# Patient Record
Sex: FEMALE | Race: WHITE | NOT HISPANIC OR LATINO
[De-identification: names, ages, dates, MRNs, and addresses within clinical notes are randomized per-mention and may not be internally consistent; named-entity substitution may affect disease eponyms.]

---

## 2017-01-17 DIAGNOSIS — E83.52 HYPERCALCEMIA: ICD-10-CM

## 2017-01-17 DIAGNOSIS — G44.209 TENSION-TYPE HEADACHE, UNSPECIFIED, NOT INTRACTABLE: ICD-10-CM

## 2017-01-17 DIAGNOSIS — R06.02 SHORTNESS OF BREATH: ICD-10-CM

## 2017-01-18 ENCOUNTER — MEDICATION RENEWAL (OUTPATIENT)
Age: 57
End: 2017-01-18

## 2017-02-02 ENCOUNTER — APPOINTMENT (OUTPATIENT)
Dept: CARDIOLOGY | Facility: CLINIC | Age: 57
End: 2017-02-02

## 2017-02-02 ENCOUNTER — NON-APPOINTMENT (OUTPATIENT)
Age: 57
End: 2017-02-02

## 2017-02-02 VITALS
BODY MASS INDEX: 30.91 KG/M2 | DIASTOLIC BLOOD PRESSURE: 104 MMHG | HEART RATE: 102 BPM | OXYGEN SATURATION: 99 % | SYSTOLIC BLOOD PRESSURE: 164 MMHG | WEIGHT: 169 LBS

## 2017-02-02 VITALS — DIASTOLIC BLOOD PRESSURE: 96 MMHG | HEART RATE: 90 BPM | SYSTOLIC BLOOD PRESSURE: 148 MMHG

## 2017-02-02 VITALS
BODY MASS INDEX: 37.86 KG/M2 | SYSTOLIC BLOOD PRESSURE: 145 MMHG | DIASTOLIC BLOOD PRESSURE: 78 MMHG | HEART RATE: 64 BPM | WEIGHT: 207 LBS | OXYGEN SATURATION: 97 %

## 2017-02-02 DIAGNOSIS — M79.1 MYALGIA: ICD-10-CM

## 2017-02-02 DIAGNOSIS — E78.00 PURE HYPERCHOLESTEROLEMIA, UNSPECIFIED: ICD-10-CM

## 2017-02-02 DIAGNOSIS — I10 ESSENTIAL (PRIMARY) HYPERTENSION: ICD-10-CM

## 2017-02-02 DIAGNOSIS — R73.09 OTHER ABNORMAL GLUCOSE: ICD-10-CM

## 2017-02-02 DIAGNOSIS — E03.9 HYPOTHYROIDISM, UNSPECIFIED: ICD-10-CM

## 2017-02-03 ENCOUNTER — NON-APPOINTMENT (OUTPATIENT)
Age: 57
End: 2017-02-03

## 2017-02-03 LAB
CK SERPL-CCNC: 47 U/L
HBA1C MFR BLD HPLC: 6.1 %

## 2017-03-27 ENCOUNTER — RX RENEWAL (OUTPATIENT)
Age: 57
End: 2017-03-27

## 2017-07-05 ENCOUNTER — APPOINTMENT (OUTPATIENT)
Dept: SURGERY | Facility: CLINIC | Age: 57
End: 2017-07-05

## 2017-08-15 RX ORDER — ATORVASTATIN CALCIUM 40 MG/1
40 TABLET, FILM COATED ORAL
Qty: 30 | Refills: 0 | Status: ACTIVE | COMMUNITY
Start: 2017-02-02

## 2017-08-24 ENCOUNTER — APPOINTMENT (OUTPATIENT)
Dept: CARDIOLOGY | Facility: CLINIC | Age: 57
End: 2017-08-24

## 2022-01-06 ENCOUNTER — APPOINTMENT (OUTPATIENT)
Dept: OTOLARYNGOLOGY | Facility: CLINIC | Age: 62
End: 2022-01-06
Payer: COMMERCIAL

## 2022-01-06 VITALS
HEIGHT: 60 IN | BODY MASS INDEX: 31.61 KG/M2 | DIASTOLIC BLOOD PRESSURE: 91 MMHG | WEIGHT: 161 LBS | SYSTOLIC BLOOD PRESSURE: 157 MMHG | RESPIRATION RATE: 96 BRPM

## 2022-01-06 PROCEDURE — 99203 OFFICE O/P NEW LOW 30 MIN: CPT | Mod: 25

## 2022-01-06 PROCEDURE — 31575 DIAGNOSTIC LARYNGOSCOPY: CPT

## 2022-01-06 PROCEDURE — 99072 ADDL SUPL MATRL&STAF TM PHE: CPT

## 2022-01-06 NOTE — HISTORY OF PRESENT ILLNESS
[de-identified] : Denia Pennington is a 62 yo female with hx hypothyroidism and hypertension who presents for evaluation of sore throat. She states that she has had an intermittent sore throat that is worse at night for the past month. She denies dysphagia or odynophagia. She denies dysphonia or dyspnea. She denies aspiration episodes, heartburn, or food regurgitation. She denies fevers, night sweats, unintentional weight loss, or new neck masses/lumps. With the sore throat, she also develops intermittent bilateral aural fullness. She denies otalgia, otorrhea, tinnitus, vertigo, or hearing change. She denies recurrent ear infections. She notes mild nasal congestion and intermittent mild rhinorrhea. She denies postnasal drainage. She notes intermittent very mild sinus pressure.\par \par She notes that she does eat late at night leaving only 1-1.5 hours before lying down. She has several cups of coffee or tea per day as well.

## 2022-01-06 NOTE — PHYSICAL EXAM
[de-identified] : Minimal cerumen right EAC removed. Left EAC clear. Bilateral external ears wnl. [] : septum deviated bilaterally [Midline] : trachea located in midline position [Laryngoscopy Performed] : laryngoscopy was performed, see procedure section for findings [Normal] : no rashes

## 2022-01-06 NOTE — ASSESSMENT
[FreeTextEntry1] : Denia Pennington presents for evaluation of chronic sore throat and intermittent bilateral aural fullness. Her endoscopic exam was notable for postcricoid edema. We discussed that given her currently diet and lifestyle regimen, and the fact that her nasal exam is benign, this is likely secondary to laryngopharyngeal reflux. As her symptoms are relatively mild, we can defer medication at this time. However, antireflux dietary and lifestyle modifications were discussed. A handout was also provided.\par \par - Follow up as needed. She will attempt lifestyle and dietary modifications and if her symptoms persist, she will call our office for follow-up. At that time, we can consider antireflux medication.

## 2022-04-26 ENCOUNTER — APPOINTMENT (OUTPATIENT)
Dept: ORTHOPEDIC SURGERY | Facility: CLINIC | Age: 62
End: 2022-04-26

## 2022-11-08 ENCOUNTER — APPOINTMENT (OUTPATIENT)
Dept: ORTHOPEDIC SURGERY | Facility: CLINIC | Age: 62
End: 2022-11-08

## 2022-11-08 VITALS — WEIGHT: 160 LBS | BODY MASS INDEX: 31.41 KG/M2 | HEIGHT: 60 IN

## 2022-11-08 DIAGNOSIS — M17.12 UNILATERAL PRIMARY OSTEOARTHRITIS, LEFT KNEE: ICD-10-CM

## 2022-11-08 DIAGNOSIS — M17.11 UNILATERAL PRIMARY OSTEOARTHRITIS, RIGHT KNEE: ICD-10-CM

## 2022-11-08 PROCEDURE — 99203 OFFICE O/P NEW LOW 30 MIN: CPT | Mod: 25

## 2022-11-08 PROCEDURE — 20610 DRAIN/INJ JOINT/BURSA W/O US: CPT | Mod: 50

## 2022-11-08 PROCEDURE — 73562 X-RAY EXAM OF KNEE 3: CPT | Mod: LT

## 2022-11-08 RX ORDER — DICLOFENAC SODIUM 100 MG/1
100 TABLET, FILM COATED, EXTENDED RELEASE ORAL
Qty: 30 | Refills: 5 | Status: ACTIVE | COMMUNITY
Start: 2022-11-08 | End: 1900-01-01

## 2022-11-08 NOTE — HISTORY OF PRESENT ILLNESS
[6] : 6 [8] : 8 [Dull/Aching] : dull/aching [Intermittent] : intermittent [Ice] : ice [Lying in bed] : lying in bed [Full time] : Work status: full time [de-identified] : 11/08/22:  Initial visit for this 62 year old female  who was doing a treadmill x 6 months ago and noticed spontaneous onset of pain behind lt knee at the time. Went for MRI lt knee dated 4/11/22 c/w full thickness cartilage loss under patella. Saw another ortho MD who gave her Gel One injection which didn't help. Still c/o pain. Here for another opinion. Can only walk 1 mile. Swims with pain. Difficulty stairclimbing.\par     Also, c/o similar rt knee pain x last 4-5 months. Lt>> rt.  Went for MRI rt knee dated 7/7/22 c/w full thickness cartilage loss under patella. Has tried Advil 600-800 mg prn.\par \par OUTSIDE RECORDS:\par RIGHT KNEE 07/07/22\par IMPRESSION:\par Severe medial patellofemoral osteoarthritis with broad full-thickness cartilage  loss and bone-on-bone apposition. Mild meniscus degeneration with fraying/partial tearing. Mild synovitis/debris. Thin multiseptated popliteal cyst and/or semimembranosus bursitis.\par \par LEFT KNEE 04/11/22\par IMPRESSION:\par Extensive full-thickness cartilage loss in the patellofemoral compartment with associated subchondral cyst formation, somewhat more pronounced than in September 2017.\par \par LEFT KNEE 09/25/17\par IMPRESSION:\par Tricompartmental degenerative changes, with cartilage loss most pronounced in the lateral aspect of the patellofemoral compartment. \par Mild lateral subluxation of the patella. \par Small popliteal cyst \par Mild fraying of the free edge of the body of the medial meniscus. Mild fraying of the inferior articular surface of the body of the lateral meniscus. \par Fraying/truncation of the free edge of the medial and lateral menisci, somewhat progressed since the prior MRI.\par Small popliteal cyst with adjacent fluid suggesting leakage/rupture. Fluid is new since September 2017.\par \par 2015 - LEFT KNEE\par IMPRESSION:\par Moderate prepatellar bursitis.\par Severe patellar chondromalacia.\par Tiny incomplete tear involving the posterior horn of the medial meniscus, likely degenerative in etiology.\par _________________________________________\par 1/31/17: 55 y/o female c/o left knee pain/tingling for last few months. s/p fall during tennis in april 2015 which she was\par treated by Dr. Gray for prepatella bursitis/cellulitis s/p aspiration. She reports a "weird sensation" when she is kneeling\par to pick something up. Occasional buckling. Pain with walking, bending, getting out of a chair, squatting. She takes advil\par and uses bengay when severe. No nocturnal awakening. No fever, chills.\par \par 6/2/15 returns getting better although still c/o some burning and puffiness above the knee.\par \par 5/19/15 Returns today doing well. No pain. off all antibiotics x 2 days.\par \par 5/12/15 Returns today now 5 days since last visit. feeling better, not as red or hot. Still swollen. On Duricef x 5 days\par and Feldene x 2 days.Here for MRI lt. 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

## 2022-11-08 NOTE — PHYSICAL EXAM
[Normal Mood and Affect] : normal mood and affect [Orientated] : orientated [Able to Communicate] : able to communicate [Well Developed] : well developed [Well Nourished] : well nourished [Left] : left knee [Right] : right knee [NL (140)] : flexion 140 degrees [NL (0)] : extension 0 degrees [5___] : hamstring 5[unfilled]/5 [Bilateral] : knee bilaterally [AP] : anteroposterior [Lateral] : lateral [[place]] : skyline [Degenerative change] : Degenerative change [Moderate patellofemoral OA] : Moderate patellofemoral OA [de-identified] : average [] : non-antalgic

## 2022-11-08 NOTE — REVIEW OF SYSTEMS
[Joint Pain] : joint pain [Negative] : Heme/Lymph [FreeTextEntry5] : htn [de-identified] : thyroid

## 2023-01-10 ENCOUNTER — APPOINTMENT (OUTPATIENT)
Dept: OTOLARYNGOLOGY | Facility: CLINIC | Age: 63
End: 2023-01-10
Payer: COMMERCIAL

## 2023-01-10 ENCOUNTER — NON-APPOINTMENT (OUTPATIENT)
Age: 63
End: 2023-01-10

## 2023-01-10 VITALS
HEART RATE: 98 BPM | HEIGHT: 60 IN | WEIGHT: 162 LBS | BODY MASS INDEX: 31.8 KG/M2 | SYSTOLIC BLOOD PRESSURE: 163 MMHG | DIASTOLIC BLOOD PRESSURE: 94 MMHG

## 2023-01-10 DIAGNOSIS — H61.21 IMPACTED CERUMEN, RIGHT EAR: ICD-10-CM

## 2023-01-10 DIAGNOSIS — J31.2 CHRONIC PHARYNGITIS: ICD-10-CM

## 2023-01-10 DIAGNOSIS — K21.9 GASTRO-ESOPHAGEAL REFLUX DISEASE W/OUT ESOPHAGITIS: ICD-10-CM

## 2023-01-10 PROCEDURE — 99213 OFFICE O/P EST LOW 20 MIN: CPT | Mod: 25

## 2023-01-10 PROCEDURE — 69210 REMOVE IMPACTED EAR WAX UNI: CPT

## 2023-01-10 NOTE — PHYSICAL EXAM
[de-identified] : Right cerumen impaction. Left EAC clear. [] : septum deviated bilaterally [Midline] : trachea located in midline position [Normal] : no rashes

## 2023-01-10 NOTE — ASSESSMENT
[FreeTextEntry1] : Denia Pennington presents for follow-up. She notes chronic sore throat. She was previously diagnosed one year ago with laryngopharyngeal reflux and flexible laryngoscopy at that time showed postcricoid edema. she attempted lifestyle and dietary changes, which helped. She notes that she has not implemented those changes recently, correlating with the start of her sore throat. She will try antireflux precautions again.\par \par She also notes right aural fullness. Right cerumen impaction was noted and removed. She has resolution of otologic symptoms.\par \par Finally, she notes recently diagnosed hyperparathyroidism by outside endocrinologist. She will get repeat labs soon and bring in all of her results so that we can discuss possible surgery if needed.\par \par - Antireflux precautions.\par - Follow up in 3 months.

## 2023-01-10 NOTE — HISTORY OF PRESENT ILLNESS
[de-identified] : Denia Pennington is a 62 yo female with hx hypothyroidism and hypertension who presents for evaluation of sore throat. She states that she has had an intermittent sore throat that is worse at night for the past month. She denies dysphagia or odynophagia. She denies dysphonia or dyspnea. She denies aspiration episodes, heartburn, or food regurgitation. She denies fevers, night sweats, unintentional weight loss, or new neck masses/lumps. With the sore throat, she also develops intermittent bilateral aural fullness. She denies otalgia, otorrhea, tinnitus, vertigo, or hearing change. She denies recurrent ear infections. She notes mild nasal congestion and intermittent mild rhinorrhea. She denies postnasal drainage. She notes intermittent very mild sinus pressure.\par \par She notes that she does eat late at night leaving only 1-1.5 hours before lying down. She has several cups of coffee or tea per day as well. [FreeTextEntry1] : 1/10/23 - Denia Pennington presents for follow-up. She notes that in the interim, she has been diagnosed with hyperparathyroidism. She has had US showing thyroid ndoules. Per her report, these nodules have been benign. She denies abdominal pain or kidney stones. She has history of laryngopharyngeal reflux and she notes recurrence of sore throat. She denies dysphagia, odynophagia, aspiration episodes, dyspnea, dysphonia, or noisy breathing. She denies fevers or chills. She notes right aural fullness.

## 2024-01-24 NOTE — HISTORY OF PRESENT ILLNESS
[FreeTextEntry1] : Referred by patient Corbin Gongora as well as PMD Dr Guy Ellington. Patient reports right breast pain. Reports normal mammo/US at [facility] [place] a few months ago. States mother was diagnosed with breast cancer at age 65. Patient had genetic testing and reports she is BRCA negative. Denies palpable mass or nipple discharge.

## 2024-01-25 ENCOUNTER — APPOINTMENT (OUTPATIENT)
Dept: BREAST CENTER | Facility: CLINIC | Age: 64
End: 2024-01-25

## 2025-04-23 ENCOUNTER — NON-APPOINTMENT (OUTPATIENT)
Age: 65
End: 2025-04-23

## 2025-04-23 ENCOUNTER — APPOINTMENT (OUTPATIENT)
Dept: OTOLARYNGOLOGY | Facility: CLINIC | Age: 65
End: 2025-04-23
Payer: COMMERCIAL

## 2025-04-23 VITALS
DIASTOLIC BLOOD PRESSURE: 86 MMHG | SYSTOLIC BLOOD PRESSURE: 156 MMHG | HEART RATE: 101 BPM | BODY MASS INDEX: 30.04 KG/M2 | WEIGHT: 153 LBS | HEIGHT: 60 IN

## 2025-04-23 DIAGNOSIS — J31.0 CHRONIC RHINITIS: ICD-10-CM

## 2025-04-23 PROCEDURE — 31231 NASAL ENDOSCOPY DX: CPT

## 2025-04-23 PROCEDURE — 99213 OFFICE O/P EST LOW 20 MIN: CPT | Mod: 25
